# Patient Record
(demographics unavailable — no encounter records)

---

## 2025-05-08 NOTE — REVIEW OF SYSTEMS
[SOB] : shortness of breath [Dyspnea on exertion] : dyspnea during exertion [Negative] : Musculoskeletal [Earache] : no earache [Sore Throat] : no sore throat

## 2025-05-08 NOTE — HISTORY OF PRESENT ILLNESS
[FreeTextEntry1] : 5/6/25 Deanne Garcia is 37 year he with prior h/o   (+ ) HTN  ( -) AODM  (+ ) smoker  (- ) lipids (- ) obesity (- ) fam hx of CAD dimple Garcia - Cardiovascular Health Evaluation Today at 1:13 pm SOAP Note Subjective: - Summary : 37-year-old male presents for cardiovascular health evaluation. Patient reports occasional chest pain with heavy lifting or exertion. He has a history of hypertension and mild hypercholesterolemia. Patient is a current smoker. - Chief Complaint (CC) : Cardiovascular health check - History of Present Illness (HPI) : Patient is a 37-year-old male who presents for a cardiovascular health evaluation. He reports occasional chest pain, particularly with heavy lifting or exertion. The patient denies experiencing chest pain during regular daily activities such as walking or climbing stairs. He has no known history of heart problems but expresses concern about potential cardiac issues. - Past Medical History : Hypertension, Mild hypercholesterolemia - Past Surgical History : Rectal surgery (date unspecified) - Family History : Not provided - Social History : Current smoker (7-8 cigarettes per day) - Review of Systems : Cardiovascular Occasional chest pain with exertion. Respiratory Reports sometimes feeling pain in chest. Neurological Occasional headaches, particularly when stressed or overthinking. - Medications : Antihypertensive medication (specific not provided) - Allergies : No known allergies Objective: - Diagnostic Results : Recent blood work performed 2-3 months ago, including cholesterol check (results not available) - Vital Signs : Not provided in the conversation - Physical Examination (PE) : Cardiovascular Normal heart sounds. Respiratory Clear breath sounds on auscultation. No abnormalities noted on physical examination. Assessment: - Summary : 37-year-old male with hypertension, mild hypercholesterolemia, and exertional chest pain. Patient is a current smoker, which increases his cardiovascular risk. The occasional chest pain with exertion warrants further evaluation to rule out underlying cardiac pathology. - Problems : - Hypertension  - Mild hypercholesterolemia  - Exertional chest pain  - Tobacco use  - Differential Diagnosis : - Stable angina  - Gastroesophageal reflux disease (GERD)  - Musculoskeletal chest pain  - Coronary artery disease  Plan:

## 2025-05-08 NOTE — DISCUSSION/SUMMARY
[FreeTextEntry1] : Atypical Chest pain  Echo Lve[facility] 55 will scehdule fir ETT [EKG obtained to assist in diagnosis and management of assessed problem(s)] : EKG obtained to assist in diagnosis and management of assessed problem(s)

## 2025-05-08 NOTE — ADDENDUM
[FreeTextEntry1] : Emile Hidalgo assisted in documentation on May 6 2025 acting as a scribe for Dr. Jun Villeda.